# Patient Record
Sex: MALE | Race: OTHER | NOT HISPANIC OR LATINO | URBAN - METROPOLITAN AREA
[De-identification: names, ages, dates, MRNs, and addresses within clinical notes are randomized per-mention and may not be internally consistent; named-entity substitution may affect disease eponyms.]

---

## 2019-04-03 ENCOUNTER — EMERGENCY (EMERGENCY)
Facility: HOSPITAL | Age: 34
LOS: 1 days | Discharge: HOME | End: 2019-04-03
Attending: EMERGENCY MEDICINE | Admitting: EMERGENCY MEDICINE
Payer: OTHER MISCELLANEOUS

## 2019-04-03 VITALS
RESPIRATION RATE: 16 BRPM | HEART RATE: 63 BPM | DIASTOLIC BLOOD PRESSURE: 94 MMHG | SYSTOLIC BLOOD PRESSURE: 138 MMHG | OXYGEN SATURATION: 99 % | TEMPERATURE: 98 F

## 2019-04-03 VITALS
HEART RATE: 61 BPM | DIASTOLIC BLOOD PRESSURE: 101 MMHG | SYSTOLIC BLOOD PRESSURE: 138 MMHG | TEMPERATURE: 98 F | RESPIRATION RATE: 16 BRPM | OXYGEN SATURATION: 100 %

## 2019-04-03 PROCEDURE — 99284 EMERGENCY DEPT VISIT MOD MDM: CPT | Mod: 25

## 2019-04-03 PROCEDURE — 73080 X-RAY EXAM OF ELBOW: CPT | Mod: 26,LT

## 2019-04-03 PROCEDURE — 73090 X-RAY EXAM OF FOREARM: CPT | Mod: 26,LT

## 2019-04-03 PROCEDURE — 73060 X-RAY EXAM OF HUMERUS: CPT | Mod: 26,LT

## 2019-04-03 PROCEDURE — 73100 X-RAY EXAM OF WRIST: CPT | Mod: 26,LT

## 2019-04-03 PROCEDURE — 73120 X-RAY EXAM OF HAND: CPT | Mod: 26,LT

## 2019-04-03 PROCEDURE — 29105 APPLICATION LONG ARM SPLINT: CPT | Mod: LT

## 2019-04-03 PROCEDURE — 99053 MED SERV 10PM-8AM 24 HR FAC: CPT

## 2019-04-03 RX ORDER — ACETAMINOPHEN 500 MG
975 TABLET ORAL ONCE
Qty: 0 | Refills: 0 | Status: COMPLETED | OUTPATIENT
Start: 2019-04-03 | End: 2019-04-03

## 2019-04-03 RX ADMIN — Medication 975 MILLIGRAM(S): at 03:25

## 2019-04-03 NOTE — ED PROVIDER NOTE - PROGRESS NOTE DETAILS
Wilton Zelaya (Resident): splinted w/ Posterior ulnar for radial head w/ a modified Volar splint for poss scaphoid injury - fingers warm to touch, d/w patient follow up and importance of repeat XR especially of scaphoid in 1 week - will give ortho follow up - safe to d/c home [place]: pain improving. splinted and placed in sling. Comfortable for dc, outpt ortho f/u.

## 2019-04-03 NOTE — ED PROVIDER NOTE - OBJECTIVE STATEMENT
32 y/o male hx of mild asthma p/w arm injury. Per patient, yesterday, fell backwards off of his truck (works as a ). Put his arms outstretched behind him to brace fall. Currently c/o some pain and swelling at L arm, mostly at L elbow. No f/c, N/V/D, abnormal gait, neck pain, diff walking, numbness or paresthesias in L hand. Patient LH dominant. TOok motrin 400mg 2 hours ago, mild relief. 32 y/o male hx of mild asthma p/w arm injury. Per patient, yesterday, fell backwards off of his truck (works as a ). Put his arms outstretched behind him to brace fall. Currently c/o some pain and swelling at L arm, mostly at L elbow. No f/c, N/V/D, abnormal gait, neck pain, diff walking, numbness or paresthesias in L hand. Patient LH dominant. TOok motrin 400mg 2 hours ago, mild relief.  [place]: 33M PMH asthma p/w L elbow/forearm/wrist pain, s/p mechanical fall ~4 feet off of truck, landed directly on posterior aspect of L elbow/forearm. Occurred ~1300, came to ED for persistent pain. Denies numbness/weakness, neck/back pain, hitting head/LOC, CP, abd pain, other MSK pain.

## 2019-04-03 NOTE — ED PROVIDER NOTE - ATTENDING CONTRIBUTION TO CARE
33M PMH asthma p/w L elbow/forearm/wrist pain, s/p mechanical fall ~4 feet off of truck, landed directly on posterior aspect of L elbow/forearm. Occurred ~1300, came to ED for persistent pain. No neuro symptoms, no pain elsewhere, no other systemic symptoms. Vitals wnl, exam as above.  ddx: Possible elbow fx, very low suspicion for scaphoid fx.   XR, symptom control, reassess.

## 2019-04-03 NOTE — ED ADULT NURSE NOTE - OBJECTIVE STATEMENT
Pt received in room 3, A&OX3 c/o L arm pain. Pt states he was working and fell off a truck, put out his arms in order to brace his fall. Now experiencing pain in L arm. No obvious deformity noted. Pt is able to move arm, mostly pain with moving L elbow. Pt states taking Motrin with minimal relief. Medicated pt as per order. Will continue to monitor.

## 2019-04-03 NOTE — ED ADULT TRIAGE NOTE - CHIEF COMPLAINT QUOTE
Pt st" I fell off work truck yesterday at 1pm, I hurt my left arm." c/o pain in left forearm, from elbow down. + adarsh radial pulses + brisk cap refill .

## 2019-04-03 NOTE — ED PROVIDER NOTE - CARDIAC, MLM
Normal rate, regular rhythm.  Heart sounds S1, S2.  No murmurs, rubs or gallops. +2 radial pulses b/l

## 2019-04-03 NOTE — ED PROVIDER NOTE - NSFOLLOWUPINSTRUCTIONS_ED_ALL_ED_FT
1) Please return to the ED should you have any new or worsening symptoms, worsening pain, develop chest pain, difficulty breathing, or any concerning symptoms  2) Please follow up with Please follow up with Orthopedics in 3-5 days. A list of local providers has been provided to you.   3) Please take Tylenol (Acetaminophen) 650 mg every 4-6 hours as needed for pain. Please do not exceed more than 4,000mg of Tylenol in a day   4) Rest, apply ice over covered skin for no more than 15 minutes at a time, keep affected extremity elevated, use compressive dressing or splint as provided and instructed.   5) Please take Motrin (Ibuprofen) 600mg by mouth every 6 hours as needed for pain. Please take this medication with food.

## 2019-04-03 NOTE — ED PROVIDER NOTE - PHYSICAL EXAMINATION
no LE edema, normal equal distal pulses.  FROM all joints except L elbow. No spinal ttp, neck FROM. Strength 5/5. Steady unassisted gait.

## 2019-04-03 NOTE — ED PROVIDER NOTE - MUSCULOSKELETAL, MLM
Some mild swelling of L elbow and L forearm. Pain with ROM of L elbow, tender over olecrenon process. No pain with ROM of L shoulder. Full ROM of L wrist. Snuffbox tenderness on L hand. No appreciable wrist or hand swelling. Some mild swelling of L elbow and L forearm. Pain with ROM of L elbow, tender over olecrenon process. No pain with ROM of L shoulder. Full ROM of L wrist. very minimal Snuffbox tenderness on L hand. No appreciable wrist or hand swelling.

## 2019-04-04 ENCOUNTER — APPOINTMENT (OUTPATIENT)
Dept: ORTHOPEDIC SURGERY | Facility: CLINIC | Age: 34
End: 2019-04-04
Payer: OTHER MISCELLANEOUS

## 2019-04-04 VITALS — WEIGHT: 186 LBS | HEIGHT: 69 IN | BODY MASS INDEX: 27.55 KG/M2

## 2019-04-04 DIAGNOSIS — Z87.09 PERSONAL HISTORY OF OTHER DISEASES OF THE RESPIRATORY SYSTEM: ICD-10-CM

## 2019-04-04 DIAGNOSIS — Z78.9 OTHER SPECIFIED HEALTH STATUS: ICD-10-CM

## 2019-04-04 DIAGNOSIS — S52.122A DISPLACED FRACTURE OF HEAD OF LEFT RADIUS, INITIAL ENCOUNTER FOR CLOSED FRACTURE: ICD-10-CM

## 2019-04-04 DIAGNOSIS — S52.125A NONDISPLACED FRACTURE OF HEAD OF LEFT RADIUS, INITIAL ENCOUNTER FOR CLOSED FRACTURE: ICD-10-CM

## 2019-04-04 DIAGNOSIS — Z83.3 FAMILY HISTORY OF DIABETES MELLITUS: ICD-10-CM

## 2019-04-04 PROBLEM — Z00.00 ENCOUNTER FOR PREVENTIVE HEALTH EXAMINATION: Status: ACTIVE | Noted: 2019-04-04

## 2019-04-04 PROCEDURE — 99203 OFFICE O/P NEW LOW 30 MIN: CPT

## 2019-04-04 RX ORDER — ALBUTEROL SULFATE 90 UG/1
INHALANT RESPIRATORY (INHALATION)
Refills: 0 | Status: ACTIVE | COMMUNITY

## 2019-04-04 NOTE — PHYSICAL EXAM
[de-identified] : Patient is WDWN, alert, and in no acute distress. Breathing is unlabored. He is grossly oriented to person, place, and time. \par \par Left Elbow:\par   Inspection/Palpation: Sugar-tong splint was removed for evaluation. Tenderness to palpation over the proximal radius. Edema and diffuse   ecchymosis. No skin lesions, no ulcers. \par   Range of Motion: Extension 40 °, flexion 110 °, supination 60 °, pronation 60 °. \par   Strength: Flexion and extension 5/5\par   Stability: No joint instability on provocative testing. \par \par Left hand: There is tenderness to palpation. Edema and ecchymosis in the hand and wrist. Decreased sensation over all of the digits. There is full arc of motion in the fingers. Patient is able to make a closed fist. Full extension and flexion at the wrist. All intrinsic and extrinsic hand muscles 5/5. No joint instability on provocative testing. Sensation is intact to light touch. \par  [de-identified] : Xrays of the left elbow were obtained from an outside medical facility and revealed a linear lucency at the proximal radial neck with surrounding soft tissue swelling. \par

## 2019-04-04 NOTE — DISCUSSION/SUMMARY
[de-identified] : The underlying pathophysiology was reviewed with the patient. Treatment options were discussed including; observation, NSAIDS, analgesics, bracing, and physical therapy.\par \par Continue with sling immobilization as needed. \par Gentle range of motions were encouraged. \par A work note was provided. \par NSAIDs as tolerated. \par Follow up in 2 weeks. Xrays upon return.

## 2019-04-04 NOTE — ADDENDUM
[FreeTextEntry1] : I, Krista Landa wrote this note acting as a scribe for Dr. Negro Vaughn on Apr 04, 2019.

## 2019-04-04 NOTE — CONSULT LETTER
[Dear  ___] : Dear  [unfilled], [Consult Letter:] : I had the pleasure of evaluating your patient, [unfilled]. [Please see my note below.] : Please see my note below. [Sincerely,] : Sincerely, [FreeTextEntry2] : MUNA TAVARES,LISHA  [FreeTextEntry3] : Dr. Negro Vaughn

## 2019-04-04 NOTE — END OF VISIT
[FreeTextEntry3] : I, Negro Vaughn MD, ordering physician, have read and attest that all the information, medical decision making and discharge instructions within are true and accurate.

## 2019-04-04 NOTE — HISTORY OF PRESENT ILLNESS
[de-identified] : Pt is a 32 y/o LHD male c/o left arm pain.  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 He has LOC. The arm became swollen and ecchymotic.  He has intermittent pain for which he takes Ibuprofen which is not providing much relief.  He went home but later that evening he was seen at [facility] where xrays were taken of his left elbow and wrist.  He was told that he fractured both his elbow and wrist. Patient was evaluated by Dr. Zelaya in the ER who referred him here today. He has some numbness and tingling in his left hand in all of the fingers.  He was placed in a splint and sling and told to follow up here. \par This is a Worker's Compensation initial evaluation visit.

## 2019-04-18 ENCOUNTER — APPOINTMENT (OUTPATIENT)
Dept: ORTHOPEDIC SURGERY | Facility: CLINIC | Age: 34
End: 2019-04-18
Payer: OTHER MISCELLANEOUS

## 2019-04-18 PROCEDURE — 73080 X-RAY EXAM OF ELBOW: CPT | Mod: LT

## 2019-04-18 PROCEDURE — 99213 OFFICE O/P EST LOW 20 MIN: CPT

## 2019-04-18 NOTE — PHYSICAL EXAM
[de-identified] : Patient is WDWN, alert, and in no acute distress. Breathing is unlabored. He is grossly oriented to person, place, and time. \par \par Left Elbow:\par   Inspection/Palpation: Sling was removed for examination. Tenderness to palpation over the proximal radius. Edema and diffuse ecchymosis. No skin lesions, no ulcers. \par   Range of Motion: Extension 40 °, flexion 110 °, supination 60 °, pronation 60 °. \par   Strength: Flexion and extension 5/5\par   Stability: No joint instability on provocative testing. \par \par Left hand: There is tenderness to palpation. Edema and ecchymosis in the hand and wrist. Decreased sensation over all of the digits. There is full arc of motion in the fingers. Patient is able to make a closed fist. Full extension and flexion at the wrist. All intrinsic and extrinsic hand muscles 5/5. No joint instability on provocative testing. Sensation is intact to light touch. \par  [de-identified] : AP, lateral and oblique views of the left elbow were obtained today on 04/18/2019 and revealed a linear lucency at the proximal radial neck with surrounding soft tissue swelling.

## 2019-04-18 NOTE — ADDENDUM
[FreeTextEntry1] : I, Krista Landa wrote this note acting as a scribe for Dr. Negro Vaughn on Apr 18, 2019.

## 2019-04-18 NOTE — DISCUSSION/SUMMARY
[de-identified] : Continue with sling immobilization as needed. \par Gentle range of motions were encouraged. \par A work note was provided. \par NSAIDs as tolerated. \par Follow up in 1-2 weeks. Xrays upon return.

## 2019-04-18 NOTE — HISTORY OF PRESENT ILLNESS
[de-identified] : Pt is a 32 y/o LHD male who presents for a followup visit involving the left arm after a fall. 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 He had LOC. The arm became swollen and ecchymotic.  He went home but later that evening was seen at [facility] where xrays were taken of his left elbow and wrist and revealed a linear lucency at the proximal radial neck. Patient was evaluated by Dr. Zelaya in the ER who referred him here. Since his initial office visit he has continued to have pain in the wrist and elbow. There are also intermittent episodes of sharp shooting pain which temporarily immobilize him. He also feels a popping in the shoulder when he tries to flex it. \par This is a Worker's Compensation followup visit.

## 2019-05-02 ENCOUNTER — APPOINTMENT (OUTPATIENT)
Dept: ORTHOPEDIC SURGERY | Facility: CLINIC | Age: 34
End: 2019-05-02

## 2019-05-09 ENCOUNTER — APPOINTMENT (OUTPATIENT)
Dept: ORTHOPEDIC SURGERY | Facility: CLINIC | Age: 34
End: 2019-05-09
Payer: OTHER MISCELLANEOUS

## 2019-05-09 DIAGNOSIS — S52.125D NONDISPLACED FRACTURE OF HEAD OF LEFT RADIUS, SUBSEQUENT ENCOUNTER FOR CLOSED FRACTURE WITH ROUTINE HEALING: ICD-10-CM

## 2019-05-09 PROCEDURE — 73080 X-RAY EXAM OF ELBOW: CPT | Mod: LT

## 2019-05-09 PROCEDURE — 99213 OFFICE O/P EST LOW 20 MIN: CPT

## 2019-05-09 NOTE — PHYSICAL EXAM
[de-identified] : Patient is WDWN, alert, and in no acute distress. Breathing is unlabored. He is grossly oriented to person, place, and time. \par \par Left Elbow:\par   Inspection/Palpation: Sling was removed for examination. Tenderness to palpation over the proximal radius. Edema and diffuse ecchymosis. No skin lesions, no ulcers. \par   Range of Motion: Extension 20 °, flexion 140 °. Full rotation. \par   Strength: Flexion and extension 5/5\par   Stability: No joint instability on provocative testing. \par \par Left hand: There is tenderness to palpation. Edema and ecchymosis in the hand and wrist. Decreased sensation over all of the digits. There is full arc of motion in the fingers. Patient is able to make a closed fist. Full extension and flexion at the wrist. All intrinsic and extrinsic hand muscles 5/5. No joint instability on provocative testing. Sensation is intact to light touch. \par  [de-identified] : AP, lateral and oblique views of the left elbow were obtained today and revealed a linear lucency at the proximal radial neck with surrounding soft tissue swelling.

## 2019-05-09 NOTE — HISTORY OF PRESENT ILLNESS
[de-identified] : Pt is a 34 y/o LHD male who presents for a followup visit involving the left hand a fall. 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 He had LOC. The arm became swollen and ecchymotic.  He went home but later that evening was seen at [facility] where xrays were taken of his left elbow and wrist and revealed a linear lucency at the proximal radial neck. Patient was evaluated by Dr. Zelaya in the ER who referred him here. Since his initial office visit he has continued to have pain in the wrist and elbow. There are also intermittent episodes of sharp shooting pain which temporarily immobilize him. He also feels a popping in the shoulder when he tries to flex it. \par This is a Worker's Compensation followup visit.

## 2019-05-09 NOTE — DISCUSSION/SUMMARY
[de-identified] : The patient wishes to proceed with physical therapy. A script was given. \par Gentle range of motions were encouraged. \par NSAIDs as tolerated. \par Follow up in 2 weeks. Xrays upon return.

## 2019-05-09 NOTE — ADDENDUM
[FreeTextEntry1] : I, Krista Landa wrote this note acting as a scribe for Dr. Negro Vaughn on May 09, 2019.

## 2019-05-30 ENCOUNTER — APPOINTMENT (OUTPATIENT)
Dept: ORTHOPEDIC SURGERY | Facility: CLINIC | Age: 34
End: 2019-05-30